# Patient Record
Sex: FEMALE | Race: WHITE | NOT HISPANIC OR LATINO | URBAN - NONMETROPOLITAN AREA
[De-identification: names, ages, dates, MRNs, and addresses within clinical notes are randomized per-mention and may not be internally consistent; named-entity substitution may affect disease eponyms.]

---

## 2018-04-04 ENCOUNTER — OFFICE VISIT (OUTPATIENT)
Dept: MEDICAL GROUP | Facility: CLINIC | Age: 53
End: 2018-04-04

## 2018-04-04 VITALS
WEIGHT: 159 LBS | DIASTOLIC BLOOD PRESSURE: 90 MMHG | BODY MASS INDEX: 27.14 KG/M2 | RESPIRATION RATE: 16 BRPM | HEIGHT: 64 IN | SYSTOLIC BLOOD PRESSURE: 138 MMHG | HEART RATE: 88 BPM | TEMPERATURE: 100.4 F | OXYGEN SATURATION: 97 %

## 2018-04-04 DIAGNOSIS — M47.819 ARTHRITIS OF SPINE: ICD-10-CM

## 2018-04-04 DIAGNOSIS — F17.210 CIGARETTE NICOTINE DEPENDENCE WITHOUT COMPLICATION: ICD-10-CM

## 2018-04-04 DIAGNOSIS — Z00.00 ENCOUNTER FOR MEDICAL EXAMINATION TO ESTABLISH CARE: ICD-10-CM

## 2018-04-04 DIAGNOSIS — J45.20 MILD INTERMITTENT ASTHMA WITHOUT COMPLICATION: ICD-10-CM

## 2018-04-04 DIAGNOSIS — N39.3 STRESS INCONTINENCE IN FEMALE: ICD-10-CM

## 2018-04-04 DIAGNOSIS — Z23 NEED FOR VACCINATION: ICD-10-CM

## 2018-04-04 DIAGNOSIS — Z79.890 HORMONE REPLACEMENT THERAPY (POSTMENOPAUSAL): ICD-10-CM

## 2018-04-04 DIAGNOSIS — L57.0 AK (ACTINIC KERATOSIS): ICD-10-CM

## 2018-04-04 DIAGNOSIS — Z13.6 SCREENING FOR CARDIOVASCULAR CONDITION: ICD-10-CM

## 2018-04-04 PROBLEM — H16.131 ACTINIC KERATITIS OF RIGHT EYE: Status: ACTIVE | Noted: 2018-04-04

## 2018-04-04 PROCEDURE — 90715 TDAP VACCINE 7 YRS/> IM: CPT | Performed by: PHYSICIAN ASSISTANT

## 2018-04-04 PROCEDURE — 90732 PPSV23 VACC 2 YRS+ SUBQ/IM: CPT | Mod: SPV | Performed by: PHYSICIAN ASSISTANT

## 2018-04-04 PROCEDURE — 99204 OFFICE O/P NEW MOD 45 MIN: CPT | Mod: 25 | Performed by: PHYSICIAN ASSISTANT

## 2018-04-04 PROCEDURE — 99406 BEHAV CHNG SMOKING 3-10 MIN: CPT | Performed by: PHYSICIAN ASSISTANT

## 2018-04-04 PROCEDURE — 90471 IMMUNIZATION ADMIN: CPT | Mod: SPV | Performed by: PHYSICIAN ASSISTANT

## 2018-04-04 PROCEDURE — 90472 IMMUNIZATION ADMIN EACH ADD: CPT | Performed by: PHYSICIAN ASSISTANT

## 2018-04-04 RX ORDER — VARENICLINE TARTRATE 1 MG/1
TABLET, FILM COATED ORAL
Qty: 60 TAB | Refills: 2 | Status: SHIPPED | OUTPATIENT
Start: 2018-04-04 | End: 2018-06-06

## 2018-04-04 RX ORDER — CYCLOBENZAPRINE HCL 5 MG
5-10 TABLET ORAL 3 TIMES DAILY PRN
COMMUNITY

## 2018-04-04 RX ORDER — NAPROXEN 500 MG/1
500 TABLET ORAL 2 TIMES DAILY WITH MEALS
Qty: 60 TAB | Refills: 2 | Status: SHIPPED | OUTPATIENT
Start: 2018-04-04 | End: 2018-06-06

## 2018-04-04 RX ORDER — ALBUTEROL SULFATE 90 UG/1
2 AEROSOL, METERED RESPIRATORY (INHALATION) EVERY 6 HOURS PRN
Qty: 8.5 G | Refills: 6 | Status: SHIPPED | OUTPATIENT
Start: 2018-04-04

## 2018-04-04 RX ORDER — INHALER, ASSIST DEVICES
1 SPACER (EA) MISCELLANEOUS ONCE
Qty: 1 EACH | Refills: 5 | Status: SHIPPED | OUTPATIENT
Start: 2018-04-04 | End: 2018-04-04

## 2018-04-04 RX ORDER — TRAZODONE HYDROCHLORIDE 50 MG/1
50 TABLET ORAL NIGHTLY
COMMUNITY

## 2018-04-04 ASSESSMENT — PATIENT HEALTH QUESTIONNAIRE - PHQ9: CLINICAL INTERPRETATION OF PHQ2 SCORE: 0

## 2018-04-04 NOTE — ASSESSMENT & PLAN NOTE
Patient states that a few months ago, she developed a small lump that is flaky, nontender and not read on her right lower leg. She feels this lump developed after laying in a tanning bed. She has not tried to remove it and has not had it evaluated.

## 2018-04-04 NOTE — ASSESSMENT & PLAN NOTE
Patient states that 2-3 times per week, she experiences coughing fits that are alleviated with albuterol inhalers. She does not currently use a spacer with her inhaler but states that she achieves good asthma control without it. She states her symptoms are worse in the evenings as she lays down to go to bed. She has never been woken up by coughing fits. She denies hemoptysis. She is a long-term smoker.

## 2018-04-04 NOTE — PROGRESS NOTES
Chief Complaint   Patient presents with   • Warts     On leg   • Enuresis     When sneezing        HISTORY OF THE PRESENT ILLNESS: This is a 52 y.o. female new patient to our practice who presents today for evaluation and management of:    Cigarette nicotine dependence without complication  This patient wishes to quit smoking. She has tried and failed nicotine patches in the past month and is requesting a different medication at this time.     Stress incontinence in female  Patient states that nearly every time she coughs, sneezes or laughs she dribbles urine. She states she does complete Kegel exercises occasionally but not on a daily basis. She has had a complete hysterectomy and states this got worse after her hysterectomy. She has not been seen by a gynecologist for this problem in the past. She has never tried medication to alleviate this problem. She did mention wanting to try something like a pessary to alleviate this problem.    Mild intermittent asthma without complication  Patient states that 2-3 times per week, she experiences coughing fits that are alleviated with albuterol inhalers. She does not currently use a spacer with her inhaler but states that she achieves good asthma control without it. She states her symptoms are worse in the evenings as she lays down to go to bed. She has never been woken up by coughing fits. She denies hemoptysis. She is a long-term smoker.    Hormone replacement therapy (postmenopausal)  Since her hysterectomy in 2012, the patient states she has been taking Premarin. About 2 weeks ago she reduced her dose to 0.625mg daily and states she is tolerating this dose reduction well. She is mostly apprehensive in stopping this medication because of possible mood changes and suicidal thoughts that have occurred when she stopped the medication for 3 days before starting her reduced dose. She is unaware but was educated today about the increased risk of breast cancer with continued  estrogen supplementation after menopause. She does get annual mammograms.    Arthritis of spine (CMS-HCC)  Patient states that arthritis of her spine was found on MRI at [facility] a few months ago. She states she does have continuous back pain but does not take any medication except for Flexeril for this at this time. She is not requesting any medications for this but simply wished to inform as this is her first visit with this provider.    AK (actinic keratosis)  Patient states that a few months ago, she developed a small lump that is flaky, nontender and not read on her right lower leg. She feels this lump developed after laying in a tanning bed. She has not tried to remove it and has not had it evaluated.    Encounter for medical examination to establish care  Patient states she has not had a primary care provider in over 10 years and wishes to establish one today.      Past Medical History:   Diagnosis Date   • Migraine syndrome        Past Surgical History:   Procedure Laterality Date   • HYSTERECTOMY, TOTAL ABDOMINAL     • APPENDECTOMY     • KNEE ARTHROSCOPY     • RADIAL HEAD ARTHROPLASTY         Family Status   Relation Status   • Mother    • Father Other   • Sister Alive     Family History   Problem Relation Age of Onset   • Adopted: Yes   • Lung Disease Mother    • Hypertension Mother    • Diabetes Mother    • Obesity Mother    • Arthritis Mother    • Heart Disease Sister    • Other Sister      many many medical problems       Social History   Substance Use Topics   • Smoking status: Current Every Day Smoker     Packs/day: 1.00     Years: 35.00     Types: Cigarettes   • Smokeless tobacco: Never Used   • Alcohol use No       Allergies: Cortisone and Latex    Current Outpatient Prescriptions Ordered in [facility]   Medication Sig Dispense Refill   • conjugated estrogen (PREMARIN) 0.625 MG Tab Take 0.625 mg by mouth every day.     • cyclobenzaprine (FLEXERIL) 5 MG tablet Take  5-10 mg by mouth 3 times a day as needed.     • traZODone (DESYREL) 50 MG Tab Take 50 mg by mouth every evening.     • aspirin 81 MG tablet Take 81 mg by mouth every day.     • albuterol 108 (90 Base) MCG/ACT Aero Soln inhalation aerosol Inhale 2 Puffs by mouth every 6 hours as needed for Shortness of Breath. 8.5 g 6   • Spacer/Aero-Holding Chambers (AEROCHAMBER MV) Misc 1 Each by Does not apply route Once for 1 dose. 1 Each 5   • naproxen (NAPROSYN) 500 MG Tab Take 1 Tab by mouth 2 times a day, with meals. 60 Tab 2   • varenicline (CHANTIX) 1 MG tablet Take 0.5 tab PO for 3 days then, 0.5 tab PO twice per day for 4 days then, 1.0 tab PO thereafter 60 Tab 2     No current Epic-ordered facility-administered medications on file.      Review of Systems:   Constitutional: Negative for fever, chills, unplanned weight change and malaise/fatigue.   HENT: Negative for ear pain or tinnitus, nosebleeds, congestion, sore throat and neck pain.    Eyes: Negative for blurred or decreased vision.   Respiratory: Positive for daily morning smokers cough and wheezing.  Negative for hemoptysis, sputum production, shortness of breath.    Cardiovascular: Negative for chest pain syncope and leg swelling.   Gastrointestinal: Negative for heartburn, nausea, vomiting and abdominal pain.   Genitourinary: See history of present illness above. Negative for dysuria, urgency and frequency.   Musculoskeletal: See history of present illness above. Negative for myalgias and joint pain.   Skin: Negative for rash, itching, nail changes or skin changes.   Neurological: Negative for dizziness, tremors, sensory change, focal weakness, tingling and headaches.   Endo/Heme/Allergies: Does not bruise/bleed easily.    Psychiatric/Behavioral: Negative for depression, suicidal ideas and memory loss. The patient is not nervous/anxious and does have insomnia, treated with trazodone.  Pt does not use recreational drugs or excessive alcohol.       Exam:  Blood  "pressure 138/90, pulse 88, temperature 38 °C (100.4 °F), resp. rate 16, height 1.626 m (5' 4\"), weight 72.1 kg (159 lb), SpO2 97 %.   Body mass index is 27.29 kg/m².  General:  Overweight female in NAD  Eyes: Conjunctiva clear, lids without ptosis, pupils equal and reactive to light accommodation.  ENMT: Nose and lips without deformity. Nasal mucosa and septum pink without evidence of purulent drainage.  Neck: Supple without masses upon palpation. Thyroid is not visibly enlarged.  Pulmonary: Inspiratory Rhonchi auscultated in bilateral upper lobes. Normal effort. No rales, or wheezing upon auscultation.   Cardiovascular: Regular rate and rhythm without murmur. Carotid and radial pulses are intact and equal bilaterally.   Extremities: No clubbing or cyanosis. Bilateral upper and lower extremities without edema.   Skin: Warm and dry. Flesh-colored, raised, dry lesion that is not warm to touch present at the right anterior mid lower leg.  Musculoskeletal: Normal gait.   Psych: Normal mood and affect. Alert and oriented x3. Judgment and insight is normal.      Medical Decision Making & Discussion:   1. Cigarette nicotine dependence without complication  Patient was advised to take the following medication as prescribed. Patient was also advised to begin taking a smoking journal. This journal should notate the time at which a cigarette craving occurs, what activity the patient completed for 15 minutes instead of smoking a cigarette, and after 15 minutes if they still wanted to smoke and if they actually smoked. This  took approximately 5 minutes to discuss.     - varenicline (CHANTIX) 1 MG tablet; Take 0.5 tab PO for 3 days then, 0.5 tab PO twice per day for 4 days then, 1.0 tab PO thereafter  Dispense: 60 Tab; Refill: 2    2. Mild intermittent asthma without complication  Patient was advised that as soon as she is able to quit smoking, she is likely to have reduced asthma symptoms. She was also advised to use a " spacer with her inhaler every time she uses it as this gets more the medication into her lungs.  - albuterol 108 (90 Base) MCG/ACT Aero Soln inhalation aerosol; Inhale 2 Puffs by mouth every 6 hours as needed for Shortness of Breath.  Dispense: 8.5 g; Refill: 6  - Spacer/Aero-Holding Chambers (AEROCHAMBER MV) Misc; 1 Each by Does not apply route Once for 1 dose.  Dispense: 1 Each; Refill: 5  - CMP (12)    3. Stress incontinence in female  Kegel muscles were discussed at this visit. Patient was advised to practice stopping the flow of urine every time she uses the restroom in order to practice better control of these muscles.  - REFERRAL TO OB/GYN    4. AK (actinic keratosis)  Patient was requested to return for follow-up visit for cryo- removal of this.  - CBC WITHOUT DIFFERENTIAL; Future    5. Screening for cardiovascular condition    - LIPID PANEL    6. Hormone replacement therapy (postmenopausal)  Patient was educated in advised that continued use of estrogen beyond menopause is not recommended due to elevated risk of breast cancer. She was advised that benefits realized in cardiovascular and bone health do not outweigh the risks of increased breast cancer rate. She was advised to begin cutting her 0.625 mg tablets of Premarin in half and to take this dose for the next 2 weeks then, to take one half tablet every other day for 2 weeks then, every third day for 2 weeks and so on until she stops using this medication altogether. She was advised that she would be closely followed for depression symptoms and treated as these arise.  - LIPID PANEL  - CBC WITHOUT DIFFERENTIAL; Future    7. Arthritis of spine (CMS-HCC)  Patient was advised that arthritis is typically alleviated with mild exercise on a daily basis.   - naproxen (NAPROSYN) 500 MG Tab; Take 1 Tab by mouth 2 times a day, with meals.  Dispense: 60 Tab; Refill: 2    8. Need for vaccination  - PneumoVax PPV23 =>1yo  - Tdap =>8yo IM        Please note that this  dictation was created using voice recognition software. I have made every reasonable attempt to correct obvious errors, but I expect that there are errors of grammar and possibly content that I did not discover before finalizing the note.    Return in about 3 weeks (around 4/25/2018) for follow up hormone weaning and wart removal.

## 2018-04-04 NOTE — ASSESSMENT & PLAN NOTE
Patient states that arthritis of her spine was found on MRI at [facility] a few months ago. She states she does have continuous back pain but does not take any medication except for Flexeril for this at this time. She is not requesting any medications for this but simply wished to inform as this is her first visit with this provider.

## 2018-04-04 NOTE — ASSESSMENT & PLAN NOTE
Patient states she has not had a primary care provider in over 10 years and wishes to establish one today.

## 2018-04-04 NOTE — ASSESSMENT & PLAN NOTE
This patient wishes to quit smoking. She has tried and failed nicotine patches in the past month and is requesting a different medication at this time.

## 2018-04-04 NOTE — ASSESSMENT & PLAN NOTE
Since her hysterectomy in 2012, the patient states she has been taking Premarin. About 2 weeks ago she reduced her dose to 0.625mg daily and states she is tolerating this dose reduction well. She is mostly apprehensive in stopping this medication because of possible mood changes and suicidal thoughts that have occurred when she stopped the medication for 3 days before starting her reduced dose. She is unaware but was educated today about the increased risk of breast cancer with continued estrogen supplementation after menopause. She does get annual mammograms.

## 2018-04-04 NOTE — LETTER
[email or web address]  Jahaira Handley P.A.-C.  [address]  [facility] [address]  Fax: [phone]   Authorization for Release/Disclosure of   Protected Health Information   Name: NIMISHA BINGHAM : 1965 SSN: [id number]   Address: [address] [place] [address] Phone:    [phone] (home)    I authorize the entity listed below to release/disclose the PHI below to:   [facility]/Jahaira Handley P.A.-C. and Jahaira Handley P.A.-C.   Provider or Entity Name:  [facility]    Address   City, [facility]   Phone:  [phone]    Fax:     Reason for request: continuity of care   Information to be released:    [  ] LAST COLONOSCOPY,  including any PATH REPORT and follow-up  [  ] LAST FIT/COLOGUARD RESULT [  ] LAST DEXA  [  ] LAST MAMMOGRAM  [  ] LAST PAP  [  ] LAST LABS [  ] RETINA EXAM REPORT  [  ] IMMUNIZATION RECORDS  [ X ] Release all info      [  ] Check here and initial the line next to each item to release ALL health information INCLUDING  _____ Care and treatment for drug and / or alcohol abuse  _____ HIV testing, infection status, or AIDS  _____ Genetic Testing    DATES OF SERVICE OR TIME PERIOD TO BE DISCLOSED: _____________  I understand and acknowledge that:  * This Authorization may be revoked at any time by you in writing, except if your health information has already been used or disclosed.  * Your health information that will be used or disclosed as a result of you signing this authorization could be re-disclosed by the recipient. If this occurs, your re-disclosed health information may no longer be protected by State or Federal laws.  * You may refuse to sign this Authorization. Your refusal will not affect your ability to obtain treatment.  * This Authorization becomes effective upon signing and will  on (date) __________.      If no date is indicated, this Authorization will  one (1) year from the signature date.    Name: Nimisha  Fredy    Signature:   Date:     4/4/2018       PLEASE FAX REQUESTED RECORDS BACK TO: [phone]

## 2018-04-04 NOTE — ASSESSMENT & PLAN NOTE
Patient states that nearly every time she coughs, sneezes or laughs she dribbles urine. She states she does complete Kegel exercises occasionally but not on a daily basis. She has had a complete hysterectomy and states this got worse after her hysterectomy. She has not been seen by a gynecologist for this problem in the past. She has never tried medication to alleviate this problem. She did mention wanting to try something like a pessary to alleviate this problem.

## 2018-04-06 ENCOUNTER — NON-PROVIDER VISIT (OUTPATIENT)
Dept: MEDICAL GROUP | Facility: CLINIC | Age: 53
End: 2018-04-06
Payer: MEDICAID

## 2018-04-06 ENCOUNTER — HOSPITAL ENCOUNTER (OUTPATIENT)
Facility: MEDICAL CENTER | Age: 53
End: 2018-04-06
Attending: PHYSICIAN ASSISTANT
Payer: MEDICAID

## 2018-04-06 DIAGNOSIS — L57.0 AK (ACTINIC KERATOSIS): ICD-10-CM

## 2018-04-06 DIAGNOSIS — Z01.89 ROUTINE LAB DRAW: ICD-10-CM

## 2018-04-06 DIAGNOSIS — Z79.890 HORMONE REPLACEMENT THERAPY (POSTMENOPAUSAL): ICD-10-CM

## 2018-04-06 LAB
ALBUMIN SERPL BCP-MCNC: 4 G/DL (ref 3.2–4.9)
ALBUMIN/GLOB SERPL: 1.2 G/DL
ALP SERPL-CCNC: 70 U/L (ref 30–99)
ALT SERPL-CCNC: 19 U/L (ref 2–50)
ANION GAP SERPL CALC-SCNC: 8 MMOL/L (ref 0–11.9)
AST SERPL-CCNC: 21 U/L (ref 12–45)
BILIRUB SERPL-MCNC: 0.2 MG/DL (ref 0.1–1.5)
BUN SERPL-MCNC: 14 MG/DL (ref 8–22)
CALCIUM SERPL-MCNC: 9.3 MG/DL (ref 8.5–10.5)
CHLORIDE SERPL-SCNC: 104 MMOL/L (ref 96–112)
CHOLEST SERPL-MCNC: 224 MG/DL (ref 100–199)
CO2 SERPL-SCNC: 26 MMOL/L (ref 20–33)
CREAT SERPL-MCNC: 0.7 MG/DL (ref 0.5–1.4)
ERYTHROCYTE [DISTWIDTH] IN BLOOD BY AUTOMATED COUNT: 46 FL (ref 35.9–50)
GLOBULIN SER CALC-MCNC: 3.3 G/DL (ref 1.9–3.5)
GLUCOSE SERPL-MCNC: 82 MG/DL (ref 65–99)
HCT VFR BLD AUTO: 45.8 % (ref 37–47)
HDLC SERPL-MCNC: 49 MG/DL
HGB BLD-MCNC: 15.2 G/DL (ref 12–16)
LDLC SERPL CALC-MCNC: 96 MG/DL
MCH RBC QN AUTO: 30 PG (ref 27–33)
MCHC RBC AUTO-ENTMCNC: 33.2 G/DL (ref 33.6–35)
MCV RBC AUTO: 90.5 FL (ref 81.4–97.8)
PLATELET # BLD AUTO: 347 K/UL (ref 164–446)
PMV BLD AUTO: 10.3 FL (ref 9–12.9)
POTASSIUM SERPL-SCNC: 4.1 MMOL/L (ref 3.6–5.5)
PROT SERPL-MCNC: 7.3 G/DL (ref 6–8.2)
RBC # BLD AUTO: 5.06 M/UL (ref 4.2–5.4)
SODIUM SERPL-SCNC: 138 MMOL/L (ref 135–145)
TRIGL SERPL-MCNC: 395 MG/DL (ref 0–149)
WBC # BLD AUTO: 11.8 K/UL (ref 4.8–10.8)

## 2018-04-06 PROCEDURE — 85027 COMPLETE CBC AUTOMATED: CPT

## 2018-04-06 PROCEDURE — 99000 SPECIMEN HANDLING OFFICE-LAB: CPT | Performed by: NURSE PRACTITIONER

## 2018-04-06 PROCEDURE — 80053 COMPREHEN METABOLIC PANEL: CPT

## 2018-04-06 PROCEDURE — 80061 LIPID PANEL: CPT

## 2018-04-06 PROCEDURE — 36415 COLL VENOUS BLD VENIPUNCTURE: CPT | Performed by: NURSE PRACTITIONER

## 2018-04-09 ENCOUNTER — APPOINTMENT (OUTPATIENT)
Dept: MEDICAL GROUP | Facility: CLINIC | Age: 53
End: 2018-04-09
Payer: MEDICAID

## 2018-04-18 ENCOUNTER — OFFICE VISIT (OUTPATIENT)
Dept: MEDICAL GROUP | Facility: CLINIC | Age: 53
End: 2018-04-18
Payer: MEDICAID

## 2018-04-18 VITALS
DIASTOLIC BLOOD PRESSURE: 84 MMHG | BODY MASS INDEX: 26.98 KG/M2 | RESPIRATION RATE: 16 BRPM | TEMPERATURE: 99.1 F | WEIGHT: 158 LBS | SYSTOLIC BLOOD PRESSURE: 124 MMHG | HEIGHT: 64 IN | HEART RATE: 79 BPM | OXYGEN SATURATION: 96 %

## 2018-04-18 DIAGNOSIS — F17.210 CIGARETTE NICOTINE DEPENDENCE WITHOUT COMPLICATION: ICD-10-CM

## 2018-04-18 DIAGNOSIS — L57.0 AK (ACTINIC KERATOSIS): ICD-10-CM

## 2018-04-18 DIAGNOSIS — Z79.890 HORMONE REPLACEMENT THERAPY (POSTMENOPAUSAL): ICD-10-CM

## 2018-04-18 PROBLEM — Z00.00 ENCOUNTER FOR MEDICAL EXAMINATION TO ESTABLISH CARE: Status: RESOLVED | Noted: 2018-04-04 | Resolved: 2018-04-18

## 2018-04-18 PROCEDURE — 17000 DESTRUCT PREMALG LESION: CPT | Performed by: PHYSICIAN ASSISTANT

## 2018-04-18 PROCEDURE — 99213 OFFICE O/P EST LOW 20 MIN: CPT | Mod: 25 | Performed by: PHYSICIAN ASSISTANT

## 2018-04-18 NOTE — ASSESSMENT & PLAN NOTE
Discussed with the patient the risks benefits alternatives to excision of the lesion. Informed consent was obtained and the patient consented to the procedure with risks to include bleeding and infection as well as damage to adjacent organs. Cryotherapy was used to burn the lesion with minimal damage to the surrounding skin.  The lseion was without bleeding. The patient was given wound care instructions.   The patient was instructed to call if there was any excessive bleeding or followup sooner if there were any signs of infection to include pain, warmth, redness or purulence.

## 2018-04-18 NOTE — ASSESSMENT & PLAN NOTE
Since her hysterectomy in 2012, the patient states she has been taking Premarin. About 5weeks ago she reduced her dose to 0.625mg daily and states she is tolerating this dose reduction well. She tried reducing her dose to every other day and her hot flashes became intolerable on her days without hormone.

## 2018-04-18 NOTE — PROGRESS NOTES
Chief Complaint   Patient presents with   • Other     procedure for actinic keratosis removal   • Nicotine Dependence   • Menopause       HISTORY OF PRESENT ILLNESS: Patient is a 52 y.o. female established patient who presents today for evaluation and management of:    AK (actinic keratosis)  Discussed with the patient the risks benefits alternatives to excision of the lesion. Informed consent was obtained and the patient consented to the procedure with risks to include bleeding and infection as well as damage to adjacent organs. Cryotherapy was used to burn the lesion with minimal damage to the surrounding skin.  The lseion was without bleeding. The patient was given wound care instructions.   The patient was instructed to call if there was any excessive bleeding or followup sooner if there were any signs of infection to include pain, warmth, redness or purulence.        Cigarette nicotine dependence without complication  Patient has started chantix and is working to quit smoking.     Hormone replacement therapy (postmenopausal)  Since her hysterectomy in 2012, the patient states she has been taking Premarin. About 5weeks ago she reduced her dose to 0.625mg daily and states she is tolerating this dose reduction well. She tried reducing her dose to every other day and her hot flashes became intolerable on her days without hormone.      Patient Active Problem List    Diagnosis Date Noted   • Cigarette nicotine dependence without complication 04/04/2018   • Mild intermittent asthma without complication 04/04/2018   • Stress incontinence in female 04/04/2018   • AK (actinic keratosis) 04/04/2018   • Hormone replacement therapy (postmenopausal) 04/04/2018   • Arthritis of spine (CMS-[facility]) 04/04/2018       Allergies:Cortisone and Latex    Current Outpatient Prescriptions   Medication Sig Dispense Refill   • conjugated estrogen (PREMARIN) 0.625 MG Tab Take 0.625 mg by mouth every day.     • cyclobenzaprine (FLEXERIL) 5 MG  tablet Take 5-10 mg by mouth 3 times a day as needed.     • aspirin 81 MG tablet Take 81 mg by mouth every day.     • albuterol 108 (90 Base) MCG/ACT Aero Soln inhalation aerosol Inhale 2 Puffs by mouth every 6 hours as needed for Shortness of Breath. 8.5 g 6   • naproxen (NAPROSYN) 500 MG Tab Take 1 Tab by mouth 2 times a day, with meals. 60 Tab 2   • varenicline (CHANTIX) 1 MG tablet Take 0.5 tab PO for 3 days then, 0.5 tab PO twice per day for 4 days then, 1.0 tab PO thereafter 60 Tab 2   • traZODone (DESYREL) 50 MG Tab Take 50 mg by mouth every evening.       No current facility-administered medications for this visit.        Social History   Substance Use Topics   • Smoking status: Current Every Day Smoker     Packs/day: 1.00     Years: 35.00     Types: Cigarettes   • Smokeless tobacco: Never Used   • Alcohol use No       Family Status   Relation Status   • Mother    • Father Other   • Sister Alive     Family History   Problem Relation Age of Onset   • Adopted: Yes   • Lung Disease Mother    • Hypertension Mother    • Diabetes Mother    • Obesity Mother    • Arthritis Mother    • Heart Disease Sister    • Other Sister      many many medical problems       Review of Systems:   Constitutional: Negative for fever, chills, weight loss and malaise/fatigue.   Respiratory: Negative for cough, shortness of breath and wheezing.    Cardiovascular: Negative for chest pain   Musculoskeletal: Negative for myalgias, back pain and joint pain.   Skin: Negative for rash and itching. Actinic keratosis present at left lower leg.   Neurological: Negative for dizziness, tingling, tremors, sensory change, focal weakness and headaches.   Endo/Heme/Allergies: Does not bruise/bleed easily.   Psychiatric/Behavioral: Negative for depression, suicidal ideas and memory loss.  The patient is not nervous/anxious and does not have insomnia.      Exam:  Blood pressure 124/84, pulse 79, temperature 37.3 °C (99.1 °F), resp. rate 16,  "height 1.626 m (5' 4\"), weight 71.7 kg (158 lb), SpO2 96 %.  Body mass index is 27.12 kg/m².  General:  Overweight female in NAD  Head: is grossly normal.  Neck: Supple without masses. Thyroid is not visibly enlarged.  Pulmonary: . Normal effort.   Cardiovascular: Carotid and radial pulses are intact and equal bilaterally.  Extremities: no clubbing, cyanosis, or edema.  Skin: rough, raised, flesh colored non-crusting, non-ruluent lesion at left lower leg was frozen with liquid nitrogen.     Medical decision-making and discussion:  1. AK (actinic keratosis)    - Consent for Surgery / Procedure    2. Cigarette nicotine dependence without complication  Pateint encouraged to continue working to quit smoking.     3. Hormone replacement therapy (postmenopausal)  Patient encouraged to take .3mg estrogen daily instead of .6mg every other day.       Please note that this dictation was created using voice recognition software. I have made every reasonable attempt to correct obvious errors, but I expect that there are errors of grammar and possibly content that I did not discover before finalizing the note.      Return for as scheduled.  "

## 2018-04-25 ENCOUNTER — OFFICE VISIT (OUTPATIENT)
Dept: MEDICAL GROUP | Facility: CLINIC | Age: 53
End: 2018-04-25
Payer: MEDICAID

## 2018-04-25 VITALS
DIASTOLIC BLOOD PRESSURE: 84 MMHG | HEART RATE: 86 BPM | RESPIRATION RATE: 16 BRPM | BODY MASS INDEX: 26.98 KG/M2 | HEIGHT: 64 IN | SYSTOLIC BLOOD PRESSURE: 118 MMHG | OXYGEN SATURATION: 94 % | WEIGHT: 158 LBS

## 2018-04-25 DIAGNOSIS — E78.1 HYPERTRIGLYCERIDEMIA: ICD-10-CM

## 2018-04-25 DIAGNOSIS — D72.829 LEUKOCYTOSIS, UNSPECIFIED TYPE: ICD-10-CM

## 2018-04-25 DIAGNOSIS — Z13.6 SCREENING FOR CARDIOVASCULAR CONDITION: ICD-10-CM

## 2018-04-25 PROCEDURE — 99213 OFFICE O/P EST LOW 20 MIN: CPT | Performed by: PHYSICIAN ASSISTANT

## 2018-04-25 NOTE — PROGRESS NOTES
Chief Complaint   Patient presents with   • Annual Exam       HISTORY OF PRESENT ILLNESS: Patient is a 52 y.o. female established patient who presents today for evaluation and management of:    Leukocytosis  Component      Latest Ref Rng & Units 4/6/2018           7:10 AM   WBC      4.8 - 10.8 K/uL 11.8 (H)   RBC      4.20 - 5.40 M/uL 5.06   Hemoglobin      12.0 - 16.0 g/dL 15.2   Hematocrit      37.0 - 47.0 % 45.8   MCV      81.4 - 97.8 fL 90.5   MCH      27.0 - 33.0 pg 30.0   MCHC      33.6 - 35.0 g/dL 33.2 (L)   RDW      35.9 - 50.0 fL 46.0   Platelet Count      164 - 446 K/uL 347   MPV      9.0 - 12.9 fL 10.3   This patient has elevated white blood cell count at this time. She has had mild fevers for her past 2 visits but denies any recent cold symptoms. She has no previous labs on record so these will be redrawn to ensure normalization in the next couple of months.    Hypertriglyceridemia  Component      Latest Ref Rng & Units 4/6/2018           7:10 AM   Cholesterol,Tot      100 - 199 mg/dL 224 (H)   Triglycerides      0 - 149 mg/dL 395 (H)   HDL      >=40 mg/dL 49   LDL      <100 mg/dL 96   This patient's labs are as above. She states she does consume a large amount of bread, pasta, rice and potatoes on a daily basis. She has no previous labs on file. She denies chest pain, shortness of breath or headache or blurry vision at this time. She would like to be dialyzed occasions before starting any medication.       Patient Active Problem List    Diagnosis Date Noted   • Leukocytosis 04/25/2018   • Hypertriglyceridemia 04/25/2018   • Cigarette nicotine dependence without complication 04/04/2018   • Mild intermittent asthma without complication 04/04/2018   • Stress incontinence in female 04/04/2018   • AK (actinic keratosis) 04/04/2018   • Hormone replacement therapy (postmenopausal) 04/04/2018   • Arthritis of spine ([facility]) 04/04/2018       Allergies:Cortisone and Latex    Current Outpatient Prescriptions    Medication Sig Dispense Refill   • conjugated estrogen (PREMARIN) 0.625 MG Tab Take 0.625 mg by mouth every day.     • cyclobenzaprine (FLEXERIL) 5 MG tablet Take 5-10 mg by mouth 3 times a day as needed.     • traZODone (DESYREL) 50 MG Tab Take 50 mg by mouth every evening.     • aspirin 81 MG tablet Take 81 mg by mouth every day.     • albuterol 108 (90 Base) MCG/ACT Aero Soln inhalation aerosol Inhale 2 Puffs by mouth every 6 hours as needed for Shortness of Breath. 8.5 g 6   • naproxen (NAPROSYN) 500 MG Tab Take 1 Tab by mouth 2 times a day, with meals. 60 Tab 2   • varenicline (CHANTIX) 1 MG tablet Take 0.5 tab PO for 3 days then, 0.5 tab PO twice per day for 4 days then, 1.0 tab PO thereafter 60 Tab 2     No current facility-administered medications for this visit.        Social History   Substance Use Topics   • Smoking status: Current Every Day Smoker     Packs/day: 1.00     Years: 35.00     Types: Cigarettes   • Smokeless tobacco: Never Used   • Alcohol use No       Family Status   Relation Status   • Mother    • Father Other   • Sister Alive   • Child Alive   • Child Alive     Family History   Problem Relation Age of Onset   • Adopted: Yes   • Lung Disease Mother    • Hypertension Mother    • Diabetes Mother    • Obesity Mother    • Arthritis Mother    • Heart Disease Sister    • Other Sister      many many medical problems       Review of Systems:   Constitutional: Negative for fever, chills, weight loss and malaise/fatigue.     Eyes: Negative for blurred vision.   Respiratory: Positive for daily morning smoker's cough. Negative for shortness of breath and wheezing.   Cardiovascular: Negative for chest pain and leg swelling.    Neurological: Negative for dizziness and headaches.   Endo/Heme/Allergies: Does not bruise/bleed easily.   Psychiatric/Behavioral: Negative for depression, suicidal ideas and memory loss.  The patient is not nervous/anxious and does not have insomnia.      Exam:  Blood  "pressure 118/84, pulse 86, resp. rate 16, height 1.626 m (5' 4\"), weight 71.7 kg (158 lb), SpO2 94 %.  Body mass index is 27.12 kg/m².  General:  Overweight female in NAD  Head: is grossly normal.  Neck: Supple without masses. Thyroid is not visibly enlarged.  Pulmonary: Clear to ausculation. Normal effort. No rales, ronchi, or wheezing.  Cardiovascular: Regular rate and rhythm without murmur. Carotid and radial pulses are intact and equal bilaterally.  Extremities: no clubbing, cyanosis, or edema.  Skin: Recent cryotherapy on actinic keratosis on right leg is healing nicely.    Medical decision-making and discussion:  1. Screening for cardiovascular condition    - LIPID PANEL    2. Leukocytosis, unspecified type  Monitor for now, retest in 6 weeks to 3 months.  - CBC WITHOUT DIFFERENTIAL; Future    3. Hypertriglyceridemia  Patient requested to make diet modifications at this time before starting medication to control her current hypertriglyceridemia. She was advised to reduce her daily carb intake without increasing her animal fat intake. She was advised to eat oatmeal every morning. She was also advised that if she is unable to reduce her triglycerides in the next 6 weeks to 3 months she may need to start medication for this.      Please note that this dictation was created using voice recognition software. I have made every reasonable attempt to correct obvious errors, but I expect that there are errors of grammar and possibly content that I did not discover before finalizing the note.      Return in about 6 weeks (around 6/6/2018) for HLD .  "

## 2018-04-25 NOTE — ASSESSMENT & PLAN NOTE
Component      Latest Ref Rng & Units 4/6/2018           7:10 AM   WBC      4.8 - 10.8 K/uL 11.8 (H)   RBC      4.20 - 5.40 M/uL 5.06   Hemoglobin      12.0 - 16.0 g/dL 15.2   Hematocrit      37.0 - 47.0 % 45.8   MCV      81.4 - 97.8 fL 90.5   MCH      27.0 - 33.0 pg 30.0   MCHC      33.6 - 35.0 g/dL 33.2 (L)   RDW      35.9 - 50.0 fL 46.0   Platelet Count      164 - 446 K/uL 347   MPV      9.0 - 12.9 fL 10.3   This patient has elevated white blood cell count at this time. She has had mild fevers for her past 2 visits but denies any recent cold symptoms. She has no previous labs on record so these will be redrawn to ensure normalization in the next couple of months.

## 2018-04-25 NOTE — ASSESSMENT & PLAN NOTE
Component      Latest Ref Rng & Units 4/6/2018           7:10 AM   Cholesterol,Tot      100 - 199 mg/dL 224 (H)   Triglycerides      0 - 149 mg/dL 395 (H)   HDL      >=40 mg/dL 49   LDL      <100 mg/dL 96   This patient's labs are as above. She states she does consume a large amount of bread, pasta, rice and potatoes on a daily basis. She has no previous labs on file. She denies chest pain, shortness of breath or headache or blurry vision at this time. She would like to be dialyzed occasions before starting any medication.

## 2018-06-06 ENCOUNTER — OFFICE VISIT (OUTPATIENT)
Dept: MEDICAL GROUP | Facility: CLINIC | Age: 53
End: 2018-06-06
Payer: MEDICAID

## 2018-06-06 VITALS
WEIGHT: 161 LBS | DIASTOLIC BLOOD PRESSURE: 80 MMHG | BODY MASS INDEX: 27.49 KG/M2 | TEMPERATURE: 98.8 F | HEART RATE: 90 BPM | SYSTOLIC BLOOD PRESSURE: 130 MMHG | OXYGEN SATURATION: 97 % | HEIGHT: 64 IN | RESPIRATION RATE: 18 BRPM

## 2018-06-06 DIAGNOSIS — L57.0 AK (ACTINIC KERATOSIS): ICD-10-CM

## 2018-06-06 DIAGNOSIS — Z12.11 COLON CANCER SCREENING: ICD-10-CM

## 2018-06-06 DIAGNOSIS — I10 ESSENTIAL HYPERTENSION: ICD-10-CM

## 2018-06-06 DIAGNOSIS — K59.03 DRUG-INDUCED CONSTIPATION: ICD-10-CM

## 2018-06-06 DIAGNOSIS — Z79.890 HORMONE REPLACEMENT THERAPY (POSTMENOPAUSAL): ICD-10-CM

## 2018-06-06 DIAGNOSIS — M47.819 ARTHRITIS OF SPINE: ICD-10-CM

## 2018-06-06 PROCEDURE — 99214 OFFICE O/P EST MOD 30 MIN: CPT | Performed by: PHYSICIAN ASSISTANT

## 2018-06-06 RX ORDER — NAPROXEN SODIUM 500 MG/1
1000 TABLET, FILM COATED, EXTENDED RELEASE ORAL
Qty: 180 TAB | Refills: 1 | Status: SHIPPED | OUTPATIENT
Start: 2018-06-06 | End: 2018-11-17 | Stop reason: SDUPTHER

## 2018-06-06 RX ORDER — SENNA AND DOCUSATE SODIUM 50; 8.6 MG/1; MG/1
1 TABLET, FILM COATED ORAL DAILY
Qty: 30 TAB | Refills: 11 | Status: SHIPPED | OUTPATIENT
Start: 2018-06-06

## 2018-06-06 NOTE — ASSESSMENT & PLAN NOTE
Patient states that arthritis of her spine was found on MRI at [facility] a few months ago. She states she does have continuous back pain but does not take any medication except for Flexeril and naproxen for this at this time.

## 2018-06-06 NOTE — PROGRESS NOTES
"Chief Complaint   Patient presents with   • Menopause     premarin refill   • Arthritis     naproxen refill       HISTORY OF PRESENT ILLNESS: Patient is a 52 y.o. female established patient who presents today for evaluation and management of:    Hormone replacement therapy (postmenopausal)  This patient had a total hysterectomy in 2012 and has been taking Premarin since that.  About 2 months ago, this patient reduced her Premarin dose to 0.625 mg daily and states this dose reduction was well tolerated.  She then tried to reduce her dose to every other day and her hot flashes became intolerable so she had been taking this on a daily basis.  She had requested medication refills but was without evidence of a normal mammogram.  She has since completed a mammogram and it has been read as \"no mammographic evidence of malignancy in either breast.\"  This was completed at [facility] in February 2018 however, records had not been obtained.    Drug-induced constipation  This patient states that when she started using Chantix to help her quit smoking, she became extremely constipated, having not had a bowel movement in approximately 1 week.  She would like to continue using Chantix to help her quit smoking but is seeking a medication to counteract this constipation that had become very uncomfortable.    Arthritis of spine (HCC)  Patient states that arthritis of her spine was found on MRI at [facility] a few months ago. She states she does have continuous back pain but does not take any medication except for Flexeril and naproxen for this at this time.     AK (actinic keratosis)  Discussed with the patient the risks benefits alternatives to cryotherapy of the lesion. Informed consent was obtained and the patient consented to the procedure with risks to include bleeding and infection as well as damage to adjacent organs. Cryotherapy was used to burn the lesion with minimal damage to the " surrounding skin.  The lseion was without bleeding. The patient was given wound care instructions.   The patient was instructed to call if there was any excessive bleeding or followup sooner if there were any signs of infection to include pain, warmth, redness or purulence.        Essential hypertension  This patient's blood pressures has been slowly increasing and her blood pressure today is 130/80 with a pulse of 90.  She states she may have white coat syndrome and has a manual blood pressure cuff at home which she will use to take a blood pressure log before her next visit.  She does smoke cigarettes and does not limit the salt in her diet.        Patient Active Problem List    Diagnosis Date Noted   • Drug-induced constipation 06/06/2018   • Essential hypertension 06/06/2018   • Leukocytosis 04/25/2018   • Hypertriglyceridemia 04/25/2018   • Cigarette nicotine dependence without complication 04/04/2018   • Mild intermittent asthma without complication 04/04/2018   • Stress incontinence in female 04/04/2018   • AK (actinic keratosis) 04/04/2018   • Hormone replacement therapy (postmenopausal) 04/04/2018   • Arthritis of spine (HCC) 04/04/2018       Allergies:Cortisone and Latex    Current Outpatient Prescriptions   Medication Sig Dispense Refill   • sennosides-docusate sodium (SENOKOT-S) 8.6-50 MG tablet Take 1 Tab by mouth every day. 30 Tab 11   • conjugated estrogen (PREMARIN) 0.625 MG Tab Take 1 Tab by mouth every day. 90 Tab 3   • naproxen (NAPRELAN) 500 MG CR tablet Take 2 Tabs by mouth every morning with breakfast. 180 Tab 1   • cyclobenzaprine (FLEXERIL) 5 MG tablet Take 5-10 mg by mouth 3 times a day as needed.     • traZODone (DESYREL) 50 MG Tab Take 50 mg by mouth every evening.     • aspirin 81 MG tablet Take 81 mg by mouth every day.     • albuterol 108 (90 Base) MCG/ACT Aero Soln inhalation aerosol Inhale 2 Puffs by mouth every 6 hours as needed for Shortness of Breath. 8.5 g 6     No current  "facility-administered medications for this visit.        Social History   Substance Use Topics   • Smoking status: Current Every Day Smoker     Packs/day: 0.50     Years: 35.00     Types: Cigarettes   • Smokeless tobacco: Never Used   • Alcohol use No       Family Status   Relation Status   • Mother    • Father Other   • Sister Alive   • Child Alive   • Child Alive     Family History   Problem Relation Age of Onset   • Adopted: Yes   • Lung Disease Mother    • Hypertension Mother    • Diabetes Mother    • Obesity Mother    • Arthritis Mother    • Heart Disease Sister    • Other Sister      many many medical problems       Review of Systems: See HPI above.   Constitutional: Negative for fever, chills, weight loss and malaise/fatigue.   Eyes: Negative for blurred vision.   Respiratory: Negative for cough, sputum production, shortness of breath and wheezing.    Cardiovascular: Negative for chest pain, palpitations, orthopnea and leg swelling.   Gastrointestinal: Negative for heartburn, nausea, vomiting and abdominal pain.   Genitourinary: Negative for dysuria, urgency and frequency.   Musculoskeletal: Negative for myalgias and joint pain.   Skin: Negative for rash and itching.   Neurological: Negative for dizziness and headaches.   Endo/Heme/Allergies: Does not bruise/bleed easily.   Psychiatric/Behavioral: Negative for depression, suicidal ideas and memory loss.  The patient is not nervous/anxious and does not have insomnia.      Exam:  Blood pressure 130/80, pulse 90, temperature 37.1 °C (98.8 °F), resp. rate 18, height 1.626 m (5' 4\"), weight 73 kg (161 lb), SpO2 97 %.  Body mass index is 27.64 kg/m².  General:  Overweight female in NAD  Head: is grossly normal.  Neck: Supple without masses. Thyroid is not visibly enlarged.  Pulmonary: Slightly course breath sounds to auscultation bilaterally. Normal effort. No rales, ronchi, or wheezing.  Cardiovascular: Regular rate and rhythm without murmur. Carotid and " radial pulses are intact and equal bilaterally.  Extremities: no clubbing, cyanosis, or edema.    Medical decision-making and discussion:  1. Colon cancer screening  - COLOGUARD (FIT DNA)    2. AK (actinic keratosis)  Cryotherapy completed again today.   - Consent for Surgery / Procedure    3. Hormone replacement therapy (postmenopausal)  This patient was advised that she needs to have mammograms every single year that she is taking this medication due to increased risk for breast cancer.   - conjugated estrogen (PREMARIN) 0.625 MG Tab; Take 1 Tab by mouth every day.  Dispense: 90 Tab; Refill: 3    4. Drug-induced constipation  - sennosides-docusate sodium (SENOKOT-S) 8.6-50 MG tablet; Take 1 Tab by mouth every day.  Dispense: 30 Tab; Refill: 11    5. Arthritis of spine (HCC)  - naproxen (NAPRELAN) 500 MG CR tablet; Take 2 Tabs by mouth every morning with breakfast.  Dispense: 180 Tab; Refill: 1    6. Essential hypertension  The patient was advised to reduce the salt in her diet, continue quitting smoking and complete blood pressure checks after sitting for at least 10 minutes and without smoking for at least 1 hour throughout the day at home.  She was requested to take a log of these blood pressures and return with these values at her next visit.      Please note that this dictation was created using voice recognition software. I have made every reasonable attempt to correct obvious errors, but I expect that there are errors of grammar and possibly content that I did not discover before finalizing the note.      Return in about 6 weeks (around 7/18/2018) for HLD, HTN.

## 2018-06-06 NOTE — ASSESSMENT & PLAN NOTE
"This patient had a total hysterectomy in 2012 and has been taking Premarin since that.  About 2 months ago, this patient reduced her Premarin dose to 0.625 mg daily and states this dose reduction was well tolerated.  She then tried to reduce her dose to every other day and her hot flashes became intolerable so she had been taking this on a daily basis.  She had requested medication refills but was without evidence of a normal mammogram.  She has since completed a mammogram and it has been read as \"no mammographic evidence of malignancy in either breast.\"  This was completed at [facility] in February 2018 however, records had not been obtained.  "

## 2018-06-06 NOTE — ASSESSMENT & PLAN NOTE
This patient's blood pressures has been slowly increasing and her blood pressure today is 130/80 with a pulse of 90.  She states she may have white coat syndrome and has a manual blood pressure cuff at home which she will use to take a blood pressure log before her next visit.  She does smoke cigarettes and does not limit the salt in her diet.

## 2018-06-06 NOTE — ASSESSMENT & PLAN NOTE
This patient states that when she started using Chantix to help her quit smoking, she became extremely constipated, having not had a bowel movement in approximately 1 week.  She would like to continue using Chantix to help her quit smoking but is seeking a medication to counteract this constipation that had become very uncomfortable.

## 2018-11-17 DIAGNOSIS — M47.819 ARTHRITIS OF SPINE: ICD-10-CM

## 2018-11-19 RX ORDER — NAPROXEN 500 MG/1
TABLET, DELAYED RELEASE ORAL
Qty: 180 TAB | Refills: 0 | Status: SHIPPED | OUTPATIENT
Start: 2018-11-19

## 2018-11-19 NOTE — TELEPHONE ENCOUNTER
Was the patient seen in the last year in this department? Yes    Does patient have an active prescription for medications requested? Yes    Received Request Via: Pharmacy     Last Appointment 6/6/18  Last Labs 4/6/18